# Patient Record
Sex: FEMALE | URBAN - METROPOLITAN AREA
[De-identification: names, ages, dates, MRNs, and addresses within clinical notes are randomized per-mention and may not be internally consistent; named-entity substitution may affect disease eponyms.]

---

## 2024-07-26 ENCOUNTER — OFFICE VISIT (OUTPATIENT)
Dept: URBAN - METROPOLITAN AREA CLINIC 107 | Facility: CLINIC | Age: 59
End: 2024-07-26

## 2024-08-16 ENCOUNTER — OFFICE VISIT (OUTPATIENT)
Dept: URBAN - METROPOLITAN AREA CLINIC 113 | Facility: CLINIC | Age: 59
End: 2024-08-16
Payer: MEDICAID

## 2024-08-16 ENCOUNTER — DASHBOARD ENCOUNTERS (OUTPATIENT)
Age: 59
End: 2024-08-16

## 2024-08-16 VITALS
RESPIRATION RATE: 12 BRPM | TEMPERATURE: 97.2 F | WEIGHT: 293 LBS | DIASTOLIC BLOOD PRESSURE: 81 MMHG | HEART RATE: 59 BPM | SYSTOLIC BLOOD PRESSURE: 145 MMHG | BODY MASS INDEX: 41.95 KG/M2 | HEIGHT: 70 IN

## 2024-08-16 DIAGNOSIS — D69.6 THROMBOCYTOPENIA: ICD-10-CM

## 2024-08-16 DIAGNOSIS — E66.01 MORBID OBESITY: ICD-10-CM

## 2024-08-16 DIAGNOSIS — R79.89 ELEVATED LFTS: ICD-10-CM

## 2024-08-16 PROBLEM — 238136002: Status: ACTIVE | Noted: 2024-08-16

## 2024-08-16 PROBLEM — 302215000: Status: ACTIVE | Noted: 2024-08-16

## 2024-08-16 PROCEDURE — 99203 OFFICE O/P NEW LOW 30 MIN: CPT | Performed by: NURSE PRACTITIONER

## 2024-08-16 RX ORDER — MECOBALAMIN 5000 MCG
AS DIRECTED LOZENGE ORAL
Status: ACTIVE | COMMUNITY

## 2024-08-16 RX ORDER — AMLODIPINE BESYLATE 5 MG/1
2 TABLET TABLET ORAL ONCE A DAY
Status: ACTIVE | COMMUNITY

## 2024-08-16 RX ORDER — PROPRANOLOL HYDROCHLORIDE 60 MG/1
1 TABLET TABLET ORAL TWICE A DAY
Status: ACTIVE | COMMUNITY

## 2024-08-16 RX ORDER — SPIRONOLACTONE 50 MG/1
1 TABLET TABLET, FILM COATED ORAL ONCE A DAY
Status: ACTIVE | COMMUNITY

## 2024-08-16 RX ORDER — THIAMINE HCL 100 MG
1 TABLET TABLET ORAL ONCE A DAY
Status: ACTIVE | COMMUNITY

## 2024-08-16 RX ORDER — ERGOCALCIFEROL (VITAMIN D2) 50 MCG
1 CAPSULE CAPSULE ORAL ONCE A DAY
Status: ACTIVE | COMMUNITY

## 2024-08-16 NOTE — HPI-TODAY'S VISIT:
60 yo woman referred by Heidi Caldwell NP for pre-operative workup for bariatric surgery. A copy of today's visit will be forwarded to the referring provider.She has a past medical history of arthritis, fatty liver disease, hernia, hypertension, essential tremors, shortness of breath, sleep apnea on CPAP, status post cholecystectomy and tonsillectomy, liver biopsy in March 2023.  Preop labs demonstrate platelet count 121, bilirubin 2.2, which was chronically elevated.  She described being told in the past that she had cirrhosis, as well as changes of fatty liver  noted during cholecystectomy.  Bariatric surgery wish for her to have a GI evaluation to determine whether or not she has liver cirrhosis prior to proceeding with bariatric surgery.  Evidence of ascites or varices we deemed her ineligible for bariatric surgery.

## 2024-08-19 ENCOUNTER — TELEPHONE ENCOUNTER (OUTPATIENT)
Dept: URBAN - METROPOLITAN AREA CLINIC 113 | Facility: CLINIC | Age: 59
End: 2024-08-19

## 2024-08-19 LAB
A/G RATIO: 1.1
AFP, SERUM, TUMOR MARKER: 11.7
ALBUMIN: 3.6
ALKALINE PHOSPHATASE: 55
ALT (SGPT): 42
AST (SGOT): 56
BILIRUBIN, TOTAL: 2.5
BUN/CREATININE RATIO: (no result)
BUN: 11
CALCIUM: 8.9
CARBON DIOXIDE, TOTAL: 23
CHLORIDE: 106
CREATININE: 0.57
EGFR: 106
GLOBULIN, TOTAL: 3.2
GLUCOSE: 107
HEMATOCRIT: 41.2
HEMOGLOBIN: 14
INR: 1.1
MCH: 31.8
MCHC: 34
MCV: 93.6
MPV: 11.1
PLATELET COUNT: 121
POTASSIUM: 4.3
PROTEIN, TOTAL: 6.8
PT: 12
RDW: 13.4
RED BLOOD CELL COUNT: 4.4
SODIUM: 138
WHITE BLOOD CELL COUNT: 3.9

## 2024-08-20 ENCOUNTER — TELEPHONE ENCOUNTER (OUTPATIENT)
Dept: URBAN - METROPOLITAN AREA CLINIC 113 | Facility: CLINIC | Age: 59
End: 2024-08-20

## 2024-08-20 ENCOUNTER — LAB OUTSIDE AN ENCOUNTER (OUTPATIENT)
Dept: URBAN - METROPOLITAN AREA CLINIC 113 | Facility: CLINIC | Age: 59
End: 2024-08-20

## 2024-09-09 ENCOUNTER — TELEPHONE ENCOUNTER (OUTPATIENT)
Dept: URBAN - METROPOLITAN AREA CLINIC 113 | Facility: CLINIC | Age: 59
End: 2024-09-09

## 2024-10-15 ENCOUNTER — OFFICE VISIT (OUTPATIENT)
Dept: URBAN - METROPOLITAN AREA CLINIC 113 | Facility: CLINIC | Age: 59
End: 2024-10-15

## 2024-10-30 ENCOUNTER — LAB OUTSIDE AN ENCOUNTER (OUTPATIENT)
Dept: URBAN - METROPOLITAN AREA CLINIC 113 | Facility: CLINIC | Age: 59
End: 2024-10-30

## 2024-10-30 ENCOUNTER — OFFICE VISIT (OUTPATIENT)
Dept: URBAN - METROPOLITAN AREA CLINIC 113 | Facility: CLINIC | Age: 59
End: 2024-10-30
Payer: MEDICAID

## 2024-10-30 VITALS
RESPIRATION RATE: 12 BRPM | SYSTOLIC BLOOD PRESSURE: 117 MMHG | HEIGHT: 70 IN | DIASTOLIC BLOOD PRESSURE: 71 MMHG | WEIGHT: 293 LBS | HEART RATE: 53 BPM | TEMPERATURE: 97.2 F | BODY MASS INDEX: 41.95 KG/M2

## 2024-10-30 DIAGNOSIS — R60.0 LOWER EXTREMITY EDEMA: ICD-10-CM

## 2024-10-30 DIAGNOSIS — E66.01 MORBID OBESITY: ICD-10-CM

## 2024-10-30 DIAGNOSIS — R79.89 ELEVATED LFTS: ICD-10-CM

## 2024-10-30 DIAGNOSIS — K76.82 HEPATIC ENCEPHALOPATHY: ICD-10-CM

## 2024-10-30 DIAGNOSIS — D69.6 THROMBOCYTOPENIA: ICD-10-CM

## 2024-10-30 DIAGNOSIS — R18.8 OTHER ASCITES: ICD-10-CM

## 2024-10-30 DIAGNOSIS — K74.60 UNSPECIFIED CIRRHOSIS OF LIVER: ICD-10-CM

## 2024-10-30 PROBLEM — 13920009: Status: ACTIVE | Noted: 2024-10-30

## 2024-10-30 PROBLEM — 389026000: Status: ACTIVE | Noted: 2024-10-30

## 2024-10-30 PROBLEM — 19943007: Status: ACTIVE | Noted: 2024-10-30

## 2024-10-30 PROCEDURE — 99215 OFFICE O/P EST HI 40 MIN: CPT | Performed by: NURSE PRACTITIONER

## 2024-10-30 RX ORDER — RIFAXIMIN 550 MG/1
1 TABLET TABLET ORAL TWICE A DAY
Qty: 180 TABLET | Refills: 3 | OUTPATIENT
Start: 2024-10-30 | End: 2025-10-25

## 2024-10-30 RX ORDER — FUROSEMIDE 40 MG/1
1 TABLET TABLET ORAL ONCE A DAY
Qty: 30 | OUTPATIENT
Start: 2024-10-30 | End: 2024-11-28

## 2024-10-30 RX ORDER — ERGOCALCIFEROL (VITAMIN D2) 50 MCG
1 CAPSULE CAPSULE ORAL ONCE A DAY
Status: ACTIVE | COMMUNITY

## 2024-10-30 RX ORDER — PROPRANOLOL HYDROCHLORIDE 60 MG/1
1 TABLET TABLET ORAL TWICE A DAY
Status: ACTIVE | COMMUNITY

## 2024-10-30 RX ORDER — SPIRONOLACTONE 100 MG/1
1 TABLET TABLET, FILM COATED ORAL ONCE A DAY
Qty: 90 TABLET | Refills: 1

## 2024-10-30 RX ORDER — MECOBALAMIN 5000 MCG
AS DIRECTED LOZENGE ORAL
Status: ACTIVE | COMMUNITY

## 2024-10-30 RX ORDER — THIAMINE HCL 100 MG
1 TABLET TABLET ORAL ONCE A DAY
Status: ACTIVE | COMMUNITY

## 2024-10-30 RX ORDER — AMLODIPINE BESYLATE 5 MG/1
2 TABLET TABLET ORAL ONCE A DAY
Status: ACTIVE | COMMUNITY

## 2024-10-30 RX ORDER — SPIRONOLACTONE 50 MG/1
1 TABLET TABLET, FILM COATED ORAL ONCE A DAY
Status: ACTIVE | COMMUNITY

## 2024-10-30 NOTE — HPI-TODAY'S VISIT:
59-year-old woman presenting for follow-up after lab work performed for elevated liver function tests and thrombocytopenia occurring in the setting of morbid obesity.  There was concerns for liver cirrhosis.  She had a liver biopsy in 2023 at [facility], and also reported being told she had changes consistent with liver cirrhosis in the time of her cholecystectomy 3 years ago.  She was planned for abdominal ultrasound the following day, and an EGD for variceal screening was a consideration.  Labs 8/16/2024 showed an elevated alpha-fetoprotein level at 11.7, T. bili 2.5, AST 56, ALT 42, ALP 55.  Platelet count was low at 121.  She was recommended an MRI of the abdomen with and without contrast to rule out liver lesions.  It appeared the incorrect imaging test was performed as only MRI without contrast.  Since her last visit, she had had two surgeries within 4 days of each other: (1) breast lumpectomy later complicated by an infected incision, and (2) small bowel obstruction s/p ventral hernia repair. She continues with abdominal pain in the left lower quadrant. She also has some upper abdominal pain, predominantly when she tries to stand up or roll over in bed. She can have some upper abdominal tightness. She has lower extremity edema and also describes her arms feeling heavy as well. She is on spironolactone 50 mg daily and amlodipine 5 mg once daily. She stopped the amlodipine on her own about a week ago, without any improvement in her lower extremity edema. There is no jaundice, though her sclera are mildly icteric. Her eyes are also blood shot today. Her sleep quality is terrible at night; she feels like she can sleep all day long.

## 2024-10-31 LAB
A/G RATIO: 1
ALBUMIN: 3.3
ALKALINE PHOSPHATASE: 54
ALT (SGPT): 18
AST (SGOT): 35
BILIRUBIN, TOTAL: 2.8
BUN/CREATININE RATIO: (no result)
BUN: 9
CALCIUM: 8.8
CARBON DIOXIDE, TOTAL: 28
CHLORIDE: 103
CREATININE: 0.89
EGFR: 76
GLOBULIN, TOTAL: 3.3
GLUCOSE: 125
HEMATOCRIT: 38.9
HEMOGLOBIN: 12.5
INR: 1.2
MCH: 31.5
MCHC: 32.1
MCV: 98
MPV: 11.2
PLATELET COUNT: 141
POTASSIUM: 4.2
PROTEIN, TOTAL: 6.6
PT: 12.5
RDW: 13.6
RED BLOOD CELL COUNT: 3.97
SODIUM: 137
WHITE BLOOD CELL COUNT: 4.6

## 2024-11-06 ENCOUNTER — LAB OUTSIDE AN ENCOUNTER (OUTPATIENT)
Dept: URBAN - METROPOLITAN AREA CLINIC 113 | Facility: CLINIC | Age: 59
End: 2024-11-06

## 2024-11-11 ENCOUNTER — LAB OUTSIDE AN ENCOUNTER (OUTPATIENT)
Dept: URBAN - METROPOLITAN AREA CLINIC 113 | Facility: CLINIC | Age: 59
End: 2024-11-11

## 2024-11-11 ENCOUNTER — TELEPHONE ENCOUNTER (OUTPATIENT)
Dept: URBAN - METROPOLITAN AREA CLINIC 113 | Facility: CLINIC | Age: 59
End: 2024-11-11

## 2024-11-13 LAB
BUN/CREATININE RATIO: (no result)
CALCIUM: 8.7
CARBON DIOXIDE: 24
CHLORIDE: 102
CREATININE: 0.76
EGFR: 91
GLUCOSE: 117
POTASSIUM: 5.3
SODIUM: 136
UREA NITROGEN (BUN): 11

## 2024-11-25 ENCOUNTER — LAB OUTSIDE AN ENCOUNTER (OUTPATIENT)
Dept: URBAN - METROPOLITAN AREA CLINIC 113 | Facility: CLINIC | Age: 59
End: 2024-11-25

## 2024-11-25 ENCOUNTER — OFFICE VISIT (OUTPATIENT)
Dept: URBAN - METROPOLITAN AREA CLINIC 113 | Facility: CLINIC | Age: 59
End: 2024-11-25
Payer: MEDICAID

## 2024-11-25 VITALS
TEMPERATURE: 97.2 F | HEART RATE: 59 BPM | SYSTOLIC BLOOD PRESSURE: 136 MMHG | BODY MASS INDEX: 41.95 KG/M2 | HEIGHT: 70 IN | WEIGHT: 293 LBS | DIASTOLIC BLOOD PRESSURE: 77 MMHG | RESPIRATION RATE: 16 BRPM

## 2024-11-25 DIAGNOSIS — K74.60 UNSPECIFIED CIRRHOSIS OF LIVER: ICD-10-CM

## 2024-11-25 DIAGNOSIS — R18.8 OTHER ASCITES: ICD-10-CM

## 2024-11-25 DIAGNOSIS — R77.2 ELEVATED AFP: ICD-10-CM

## 2024-11-25 DIAGNOSIS — K76.82 HEPATIC ENCEPHALOPATHY: ICD-10-CM

## 2024-11-25 DIAGNOSIS — R60.0 LOWER EXTREMITY EDEMA: ICD-10-CM

## 2024-11-25 PROCEDURE — 99214 OFFICE O/P EST MOD 30 MIN: CPT | Performed by: INTERNAL MEDICINE

## 2024-11-25 RX ORDER — MECOBALAMIN 5000 MCG
AS DIRECTED LOZENGE ORAL
Status: ACTIVE | COMMUNITY

## 2024-11-25 RX ORDER — AMLODIPINE BESYLATE 5 MG/1
2 TABLET TABLET ORAL ONCE A DAY
Status: ON HOLD | COMMUNITY

## 2024-11-25 RX ORDER — SPIRONOLACTONE 100 MG/1
1 TABLET TABLET, FILM COATED ORAL ONCE A DAY
Qty: 90 | Refills: 3

## 2024-11-25 RX ORDER — RIFAXIMIN 550 MG/1
1 TABLET TABLET ORAL TWICE A DAY
Qty: 180 TABLET | Refills: 3 | Status: ON HOLD | COMMUNITY
Start: 2024-10-30 | End: 2025-10-25

## 2024-11-25 RX ORDER — SPIRONOLACTONE 100 MG/1
1 TABLET TABLET, FILM COATED ORAL ONCE A DAY
Qty: 90 TABLET | Refills: 1 | Status: ACTIVE | COMMUNITY

## 2024-11-25 RX ORDER — THIAMINE HCL 100 MG
1 TABLET TABLET ORAL ONCE A DAY
Status: ACTIVE | COMMUNITY

## 2024-11-25 RX ORDER — PROPRANOLOL HYDROCHLORIDE 60 MG/1
1 TABLET TABLET ORAL TWICE A DAY
Status: ACTIVE | COMMUNITY

## 2024-11-25 RX ORDER — ERGOCALCIFEROL (VITAMIN D2) 50 MCG
1 CAPSULE CAPSULE ORAL ONCE A DAY
Status: ACTIVE | COMMUNITY

## 2024-11-25 RX ORDER — FUROSEMIDE 40 MG/1
1 TABLET TABLET ORAL ONCE A DAY
Qty: 90 | Refills: 3
Start: 2024-10-30 | End: 2025-11-20

## 2024-11-25 RX ORDER — FUROSEMIDE 40 MG/1
1 TABLET TABLET ORAL ONCE A DAY
Qty: 30 | Status: ACTIVE | COMMUNITY
Start: 2024-10-30 | End: 2024-11-28

## 2024-11-25 NOTE — HPI-TODAY'S VISIT:
Patient returns today in follow-up.  She reports she is doing better.  She has had a lot to deal with recently being treated for breast cancer and then having a bowel obstruction.  At her last visit she was started on some diuretics for some increasing lower extremity edema.  She reports these have been very helpful.  She is trying to avoid salt in her diet.  She has had no blood in her stool.  She denies any swallowing difficulty. Labs in October reviewed showed bilirubin 2.8 otherwise normal LFTs.  CBC was normal.  INR was 1.2.  Her AFP was mildly elevated at 11.  She had an MRI without contrast which showed no mass followed by an ultrasound in November which also showed no mass.

## 2024-11-26 LAB
A/G RATIO: 0.9
ABSOLUTE BASOPHILS: 30
ABSOLUTE EOSINOPHILS: 179
ABSOLUTE LYMPHOCYTES: 961
ABSOLUTE MONOCYTES: 296
ABSOLUTE NEUTROPHILS: 2333
ALBUMIN: 3.7
ALKALINE PHOSPHATASE: 61
ALT (SGPT): 25
AST (SGOT): 44
BASOPHILS: 0.8
BILIRUBIN, TOTAL: 2.5
BUN/CREATININE RATIO: (no result)
BUN: 10
CALCIUM: 9.1
CARBON DIOXIDE, TOTAL: 21
CHLORIDE: 101
CREATININE: 0.78
EGFR: 89
EOSINOPHILS: 4.7
GLOBULIN, TOTAL: 3.9
GLUCOSE: 127
HEMATOCRIT: 43.2
HEMOGLOBIN: 14.1
INR: 1.2
LYMPHOCYTES: 25.3
MCH: 31.5
MCHC: 32.6
MCV: 96.6
MONOCYTES: 7.8
MPV: 11.1
NEUTROPHILS: 61.4
PLATELET COUNT: 150
POTASSIUM: 3.8
PROTEIN, TOTAL: 7.6
PT: 12.4
RDW: 12.9
RED BLOOD CELL COUNT: 4.47
SODIUM: 135
WHITE BLOOD CELL COUNT: 3.8

## 2024-12-03 ENCOUNTER — OFFICE VISIT (OUTPATIENT)
Dept: URBAN - METROPOLITAN AREA MEDICAL CENTER 19 | Facility: MEDICAL CENTER | Age: 59
End: 2024-12-03
Payer: MEDICAID

## 2024-12-03 DIAGNOSIS — K74.60 ADVANCED CIRRHOSIS: ICD-10-CM

## 2024-12-03 DIAGNOSIS — I85.10 ESOPH VARICE OTHER DIS: ICD-10-CM

## 2024-12-03 DIAGNOSIS — K76.6 CLINICALLY SIGNIFICANT PORTAL HYPERTENSION: ICD-10-CM

## 2024-12-03 DIAGNOSIS — K31.89 PORTAL HYPERTENSIVE GASTROPATHY: ICD-10-CM

## 2024-12-03 PROCEDURE — 43235 EGD DIAGNOSTIC BRUSH WASH: CPT | Performed by: INTERNAL MEDICINE

## 2024-12-03 RX ORDER — FUROSEMIDE 40 MG/1
1 TABLET TABLET ORAL ONCE A DAY
Qty: 90 | Refills: 3 | Status: ACTIVE | COMMUNITY
Start: 2024-10-30 | End: 2025-11-20

## 2024-12-03 RX ORDER — RIFAXIMIN 550 MG/1
1 TABLET TABLET ORAL TWICE A DAY
Qty: 180 TABLET | Refills: 3 | Status: ON HOLD | COMMUNITY
Start: 2024-10-30 | End: 2025-10-25

## 2024-12-03 RX ORDER — AMLODIPINE BESYLATE 5 MG/1
2 TABLET TABLET ORAL ONCE A DAY
Status: ON HOLD | COMMUNITY

## 2024-12-03 RX ORDER — ERGOCALCIFEROL (VITAMIN D2) 50 MCG
1 CAPSULE CAPSULE ORAL ONCE A DAY
Status: ACTIVE | COMMUNITY

## 2024-12-03 RX ORDER — THIAMINE HCL 100 MG
1 TABLET TABLET ORAL ONCE A DAY
Status: ACTIVE | COMMUNITY

## 2024-12-03 RX ORDER — SPIRONOLACTONE 100 MG/1
1 TABLET TABLET, FILM COATED ORAL ONCE A DAY
Qty: 90 | Refills: 3 | Status: ACTIVE | COMMUNITY

## 2024-12-03 RX ORDER — MECOBALAMIN 5000 MCG
AS DIRECTED LOZENGE ORAL
Status: ACTIVE | COMMUNITY

## 2024-12-03 RX ORDER — PROPRANOLOL HYDROCHLORIDE 60 MG/1
1 TABLET TABLET ORAL TWICE A DAY
Status: ACTIVE | COMMUNITY

## 2025-01-29 ENCOUNTER — OFFICE VISIT (OUTPATIENT)
Dept: URBAN - METROPOLITAN AREA CLINIC 113 | Facility: CLINIC | Age: 60
End: 2025-01-29
Payer: MEDICAID

## 2025-01-29 VITALS
WEIGHT: 293 LBS | TEMPERATURE: 97.9 F | SYSTOLIC BLOOD PRESSURE: 137 MMHG | BODY MASS INDEX: 41.95 KG/M2 | RESPIRATION RATE: 20 BRPM | HEIGHT: 70 IN | HEART RATE: 55 BPM | DIASTOLIC BLOOD PRESSURE: 75 MMHG

## 2025-01-29 DIAGNOSIS — K76.82 HEPATIC ENCEPHALOPATHY: ICD-10-CM

## 2025-01-29 DIAGNOSIS — K74.60 UNSPECIFIED CIRRHOSIS OF LIVER: ICD-10-CM

## 2025-01-29 DIAGNOSIS — R60.0 LOWER EXTREMITY EDEMA: ICD-10-CM

## 2025-01-29 PROCEDURE — 99214 OFFICE O/P EST MOD 30 MIN: CPT | Performed by: INTERNAL MEDICINE

## 2025-01-29 RX ORDER — FUROSEMIDE 40 MG/1
1 TABLET TABLET ORAL ONCE A DAY
Qty: 90 | Refills: 3 | Status: ACTIVE | COMMUNITY
Start: 2024-10-30 | End: 2025-11-20

## 2025-01-29 RX ORDER — PROPRANOLOL HYDROCHLORIDE 60 MG/1
1 TABLET TABLET ORAL TWICE A DAY
Status: ACTIVE | COMMUNITY

## 2025-01-29 RX ORDER — ERGOCALCIFEROL (VITAMIN D2) 50 MCG
1 CAPSULE CAPSULE ORAL ONCE A DAY
Status: ACTIVE | COMMUNITY

## 2025-01-29 RX ORDER — SPIRONOLACTONE 100 MG/1
1 TABLET TABLET, FILM COATED ORAL ONCE A DAY
Qty: 90 | Refills: 3 | Status: ACTIVE | COMMUNITY

## 2025-01-29 RX ORDER — THIAMINE HCL 100 MG
1 TABLET TABLET ORAL ONCE A DAY
Status: ACTIVE | COMMUNITY

## 2025-01-29 RX ORDER — PROPRANOLOL HYDROCHLORIDE 60 MG/1
1 TABLET TABLET ORAL TWICE A DAY
OUTPATIENT

## 2025-01-29 RX ORDER — ANASTROZOLE 1 MG/1
1 TABLET TABLET, FILM COATED ORAL ONCE A DAY
Status: ACTIVE | COMMUNITY

## 2025-01-29 RX ORDER — AMLODIPINE BESYLATE 5 MG/1
2 TABLET TABLET ORAL ONCE A DAY
Status: ON HOLD | COMMUNITY

## 2025-01-29 RX ORDER — FUROSEMIDE 40 MG/1
1 TABLET TABLET ORAL ONCE A DAY
OUTPATIENT
Start: 2024-10-30

## 2025-01-29 RX ORDER — RIFAXIMIN 550 MG/1
1 TABLET TABLET ORAL TWICE A DAY
Qty: 180 TABLET | Refills: 3 | Status: ON HOLD | COMMUNITY
Start: 2024-10-30 | End: 2025-10-25

## 2025-01-29 RX ORDER — SPIRONOLACTONE 100 MG/1
1 TABLET TABLET, FILM COATED ORAL ONCE A DAY
OUTPATIENT

## 2025-01-29 RX ORDER — MECOBALAMIN 5000 MCG
AS DIRECTED LOZENGE ORAL
Status: ACTIVE | COMMUNITY

## 2025-01-29 NOTE — HPI-TODAY'S VISIT:
Patient presents today in follow-up.  She reports she is doing okay.  She denies any jaundice or dark urine.  No abdominal pain other than some intermittent cramping.  She had a bowel obstruction previously.  She has previously failed GLP medications.  She is avoiding sodium.  She reports her swelling is much better since stopping the amlodipine and starting diuretics. I reviewed with her most recent labs which demonstrated a CBC that was normal and INR of 1.2.  Bilirubin 2.5 and AST 44 and ALT of normal.  Ultrasound in November was negative for mass. She had a upper endoscopy performed in December which showed grade 1 varices as well as mild portal hypertensive gastropathy.

## 2025-04-29 ENCOUNTER — OFFICE VISIT (OUTPATIENT)
Dept: URBAN - METROPOLITAN AREA CLINIC 113 | Facility: CLINIC | Age: 60
End: 2025-04-29
Payer: MEDICAID

## 2025-04-29 ENCOUNTER — LAB OUTSIDE AN ENCOUNTER (OUTPATIENT)
Dept: URBAN - METROPOLITAN AREA CLINIC 113 | Facility: CLINIC | Age: 60
End: 2025-04-29

## 2025-04-29 DIAGNOSIS — R60.0 LOWER EXTREMITY EDEMA: ICD-10-CM

## 2025-04-29 DIAGNOSIS — K74.60 UNSPECIFIED CIRRHOSIS OF LIVER: ICD-10-CM

## 2025-04-29 DIAGNOSIS — K76.82 HEPATIC ENCEPHALOPATHY: ICD-10-CM

## 2025-04-29 PROCEDURE — 99214 OFFICE O/P EST MOD 30 MIN: CPT | Performed by: INTERNAL MEDICINE

## 2025-04-29 RX ORDER — ANASTROZOLE 1 MG/1
1 TABLET TABLET, FILM COATED ORAL ONCE A DAY
Status: ACTIVE | COMMUNITY

## 2025-04-29 RX ORDER — ERGOCALCIFEROL (VITAMIN D2) 50 MCG
1 CAPSULE CAPSULE ORAL ONCE A DAY
Status: ACTIVE | COMMUNITY

## 2025-04-29 RX ORDER — AMLODIPINE BESYLATE 5 MG/1
2 TABLET TABLET ORAL ONCE A DAY
Status: ON HOLD | COMMUNITY

## 2025-04-29 RX ORDER — SPIRONOLACTONE 100 MG/1
1 TABLET TABLET, FILM COATED ORAL ONCE A DAY
Status: ACTIVE | COMMUNITY

## 2025-04-29 RX ORDER — FUROSEMIDE 40 MG/1
1 TABLET TABLET ORAL ONCE A DAY
Status: ACTIVE | COMMUNITY
Start: 2024-10-30

## 2025-04-29 RX ORDER — RIFAXIMIN 550 MG/1
1 TABLET TABLET ORAL TWICE A DAY
Qty: 180 TABLET | Refills: 3 | Status: ON HOLD | COMMUNITY
Start: 2024-10-30 | End: 2025-10-25

## 2025-04-29 RX ORDER — MECOBALAMIN 5000 MCG
AS DIRECTED LOZENGE ORAL
Status: ACTIVE | COMMUNITY

## 2025-04-29 RX ORDER — PROPRANOLOL HYDROCHLORIDE 60 MG/1
1 TABLET TABLET ORAL TWICE A DAY
Status: ACTIVE | COMMUNITY

## 2025-04-29 RX ORDER — THIAMINE HCL 100 MG
1 TABLET TABLET ORAL ONCE A DAY
Status: ACTIVE | COMMUNITY

## 2025-04-29 NOTE — HPI-TODAY'S VISIT:
Patient presents today in follow-up.  She continues to struggle with her weight.  She cannot do much physical stuff.  She is urinating well.  Denies any jaundice or dark urine.  No blood in the stool.  She denies any confusion.

## 2025-04-30 LAB
A/G RATIO: 1
ABSOLUTE BASOPHILS: 46
ABSOLUTE EOSINOPHILS: 184
ABSOLUTE LYMPHOCYTES: 1058
ABSOLUTE MONOCYTES: 460
ABSOLUTE NEUTROPHILS: 2852
ALBUMIN: 3.6
ALKALINE PHOSPHATASE: 101
ALT (SGPT): 29
AST (SGOT): 43
BASOPHILS: 1
BILIRUBIN, TOTAL: 2.2
BUN/CREATININE RATIO: (no result)
BUN: 8
CALCIUM: 8.9
CARBON DIOXIDE, TOTAL: 29
CHLORIDE: 100
COMMENT(S): (no result)
CREATININE: 0.8
EGFR: 85
EOSINOPHILS: 4
GLOBULIN, TOTAL: 3.6
GLUCOSE: 178
HEMATOCRIT: 40.2
HEMOGLOBIN: 13.2
INR: 1.1
LYMPHOCYTES: 23
MCH: 31.4
MCHC: 32.8
MCV: 95.7
MONOCYTES: 10
MPV: 10.6
NEUTROPHILS: 62
NOTE: (no result)
PLATELET COUNT: 129
POTASSIUM: 4.1
PROTEIN, TOTAL: 7.2
PT: 11.9
RDW: 12.9
RED BLOOD CELL COUNT: 4.2
SODIUM: 137
WHITE BLOOD CELL COUNT: 4.6

## 2025-06-27 ENCOUNTER — TELEPHONE ENCOUNTER (OUTPATIENT)
Dept: URBAN - METROPOLITAN AREA CLINIC 113 | Facility: CLINIC | Age: 60
End: 2025-06-27